# Patient Record
Sex: MALE | URBAN - METROPOLITAN AREA
[De-identification: names, ages, dates, MRNs, and addresses within clinical notes are randomized per-mention and may not be internally consistent; named-entity substitution may affect disease eponyms.]

---

## 2023-06-13 ENCOUNTER — OFFICE VISIT (OUTPATIENT)
Dept: URBAN - METROPOLITAN AREA CLINIC 30 | Facility: CLINIC | Age: 73
End: 2023-06-13
Payer: COMMERCIAL

## 2023-06-13 DIAGNOSIS — H52.4 PRESBYOPIA: ICD-10-CM

## 2023-06-13 DIAGNOSIS — H40.053 OCULAR HYPERTENSION, BILATERAL: ICD-10-CM

## 2023-06-13 DIAGNOSIS — H25.813 COMBINED FORMS OF AGE-RELATED CATARACT, BILATERAL: Primary | ICD-10-CM

## 2023-06-13 PROCEDURE — 92004 COMPRE OPH EXAM NEW PT 1/>: CPT | Performed by: OPTOMETRIST

## 2023-06-13 PROCEDURE — 92133 CPTRZD OPH DX IMG PST SGM ON: CPT | Performed by: OPTOMETRIST

## 2023-06-13 PROCEDURE — 92134 CPTRZ OPH DX IMG PST SGM RTA: CPT | Performed by: OPTOMETRIST

## 2023-06-13 ASSESSMENT — VISUAL ACUITY
OD: 20/25
OS: 20/25

## 2023-06-13 ASSESSMENT — INTRAOCULAR PRESSURE
OS: 25
OD: 25

## 2023-06-13 ASSESSMENT — KERATOMETRY
OS: 43.60
OD: 44.03

## 2023-06-13 NOTE — IMPRESSION/PLAN
Impression: Presbyopia: H52.4. Plan: Spec rx issued, long discussion on FT BF/TF vs PALs. Has FT BF currently.

## 2023-06-13 NOTE — IMPRESSION/PLAN
Impression: Ocular hypertension, bilateral: H40.053. Denies Fam Hx of glc Plan: IOPs higher today, elevated at 25 OU. Rec baseline glc diagnostics.  

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

## 2024-07-31 ENCOUNTER — OFFICE VISIT (OUTPATIENT)
Dept: URBAN - NONMETROPOLITAN AREA CLINIC 13 | Facility: CLINIC | Age: 74
End: 2024-07-31
Payer: COMMERCIAL

## 2024-07-31 DIAGNOSIS — H25.813 COMBINED FORMS OF AGE-RELATED CATARACT, BILATERAL: ICD-10-CM

## 2024-07-31 DIAGNOSIS — H02.831 DERMATOCHALASIS OF RIGHT UPPER EYELID: ICD-10-CM

## 2024-07-31 DIAGNOSIS — H40.053 OCULAR HYPERTENSION, BILATERAL: Primary | ICD-10-CM

## 2024-07-31 DIAGNOSIS — H52.4 PRESBYOPIA: ICD-10-CM

## 2024-07-31 PROCEDURE — 92014 COMPRE OPH EXAM EST PT 1/>: CPT | Performed by: OPTOMETRIST

## 2024-07-31 PROCEDURE — 76514 ECHO EXAM OF EYE THICKNESS: CPT | Performed by: OPTOMETRIST

## 2024-07-31 PROCEDURE — 92133 CPTRZD OPH DX IMG PST SGM ON: CPT | Performed by: OPTOMETRIST

## 2024-07-31 ASSESSMENT — VISUAL ACUITY
OS: 20/25
OD: 20/25

## 2024-07-31 ASSESSMENT — INTRAOCULAR PRESSURE
OD: 24
OS: 24